# Patient Record
Sex: FEMALE | Race: WHITE | NOT HISPANIC OR LATINO | Employment: OTHER | URBAN - METROPOLITAN AREA
[De-identification: names, ages, dates, MRNs, and addresses within clinical notes are randomized per-mention and may not be internally consistent; named-entity substitution may affect disease eponyms.]

---

## 2019-10-07 ENCOUNTER — TELEPHONE (OUTPATIENT)
Dept: ENDOSCOPY | Facility: HOSPITAL | Age: 74
End: 2019-10-07

## 2019-10-07 DIAGNOSIS — R93.89 ABNORMAL FINDING ON IMAGING: Primary | ICD-10-CM

## 2019-10-07 NOTE — TELEPHONE ENCOUNTER
----- Message from Elkin Olivo MD sent at 10/4/2019  1:56 PM CDT -----  Regarding: RE: External referral  EUS for dilated duct and liver lesions  ----- Message -----  From: Alee Kohli MA  Sent: 10/4/2019   1:32 PM CDT  To: Elkin Olivo MD  Subject: FW: External referral                            Patient being referred for abn imaging showing dilated bile duct and liver lesion.   Please review  ----- Message -----  From: Maribel Barrios  Sent: 10/3/2019  12:45 PM CDT  To: Gualberto DELGADO Staff Dylon Moctezuma  Subject: External referral                                Patient being referred by Dr. Gold for possible EUS? Referral and records scanned into . Please advise.    Thanks!  Maribel

## 2019-10-08 ENCOUNTER — TELEPHONE (OUTPATIENT)
Dept: ENDOSCOPY | Facility: HOSPITAL | Age: 74
End: 2019-10-08

## 2019-10-08 PROBLEM — R93.89 ABNORMAL FINDINGS ON DIAGNOSTIC IMAGING OF OTHER SPECIFIED BODY STRUCTURES: Status: ACTIVE | Noted: 2019-10-08

## 2019-10-08 NOTE — TELEPHONE ENCOUNTER
----- Message from Anup Nunez sent at 10/8/2019  2:24 PM CDT -----  Contact: Pt's  French  Type:  Needs Medical Advice    Who Called: The caller states that he just missed your call and would like a call back please.    Best Call Back Number: [phone]

## 2019-10-15 ENCOUNTER — TELEPHONE (OUTPATIENT)
Dept: ENDOSCOPY | Facility: HOSPITAL | Age: 74
End: 2019-10-15

## 2019-10-15 NOTE — TELEPHONE ENCOUNTER
----- Message from Urban Avila sent at 10/15/2019 11:05 AM CDT -----  Contact: padmini ([facility] gastro): [phone]  Padmini called to speak with Shayla agee the pt     Please contact padmini ([facility] gastro): [phone]

## 2019-10-15 NOTE — TELEPHONE ENCOUNTER
----- Message from Urban Avila sent at 10/15/2019 11:05 AM CDT -----  Contact: padmini ([facility] gastro): [phone]  Padmini called to speak with Shayla agee the pt     Please contact padmini ([facility] gastro): [phone]

## 2019-10-22 ENCOUNTER — TELEPHONE (OUTPATIENT)
Dept: ENDOSCOPY | Facility: HOSPITAL | Age: 74
End: 2019-10-22

## 2019-10-22 NOTE — TELEPHONE ENCOUNTER
----- Message from Telly Foreman MD sent at 10/18/2019  2:18 PM CDT -----  Need [facility] clinic visit with the CD of her imaging. If no possible then EUS+/- ERCP and bring the CD's on the day of the procedure.  Telly Foreman MD  ----- Message -----  From: Alee Kohli MA  Sent: 10/18/2019   2:06 PM CDT  To: Telly Foreman MD    Patient was referred here, but had EUS done by Dr León. Dr Gold sent updated records because he still referring her here. Please review

## 2019-10-22 NOTE — TELEPHONE ENCOUNTER
----- Message from Urban Avila sent at 10/22/2019 10:22 AM CDT -----  Contact: pt: [phone]  Pt is returning a call to the clinic from Shayla    Please contact pt: [phone]

## 2019-10-23 ENCOUNTER — TELEPHONE (OUTPATIENT)
Dept: ENDOSCOPY | Facility: HOSPITAL | Age: 74
End: 2019-10-23

## 2019-10-23 NOTE — TELEPHONE ENCOUNTER
----- Message from Elly Schmidt sent at 10/23/2019  1:48 PM CDT -----  Contact: pt [phone]  Pt states that all three doctors are in network and she's going to keep her current appt

## 2019-10-31 ENCOUNTER — OFFICE VISIT (OUTPATIENT)
Dept: GASTROENTEROLOGY | Facility: CLINIC | Age: 74
End: 2019-10-31
Payer: MEDICARE

## 2019-10-31 VITALS
BODY MASS INDEX: 31.81 KG/M2 | DIASTOLIC BLOOD PRESSURE: 79 MMHG | HEART RATE: 66 BPM | SYSTOLIC BLOOD PRESSURE: 130 MMHG | WEIGHT: 209.19 LBS

## 2019-10-31 DIAGNOSIS — R93.89 ABNORMAL FINDINGS ON DIAGNOSTIC IMAGING OF OTHER SPECIFIED BODY STRUCTURES: ICD-10-CM

## 2019-10-31 DIAGNOSIS — K83.8 DILATED INTRAHEPATIC BILE DUCT: Primary | ICD-10-CM

## 2019-10-31 PROCEDURE — 99204 OFFICE O/P NEW MOD 45 MIN: CPT | Mod: S$PBB,,, | Performed by: INTERNAL MEDICINE

## 2019-10-31 PROCEDURE — 99213 OFFICE O/P EST LOW 20 MIN: CPT | Mod: PBBFAC,PO

## 2019-10-31 PROCEDURE — 99999 PR PBB SHADOW E&M-EST. PATIENT-LVL III: CPT | Mod: PBBFAC,,,

## 2019-10-31 PROCEDURE — 99999 PR PBB SHADOW E&M-EST. PATIENT-LVL III: ICD-10-PCS | Mod: PBBFAC,,,

## 2019-10-31 PROCEDURE — 99204 PR OFFICE/OUTPT VISIT, NEW, LEVL IV, 45-59 MIN: ICD-10-PCS | Mod: S$PBB,,, | Performed by: INTERNAL MEDICINE

## 2019-10-31 NOTE — PATIENT INSTRUCTIONS
Your ERCP is scheduled for 7:30am  Monday December 2,2019  With Dr Olivo at [facility], [address] [place] [address].  Check in at the admit desk on the first floor of the hospital.  Please contact the office at [phone] two days before procedure date to reschedule if you needed.    To ensure that your test is accurate and complete you must follow these instructions.  If you have any questions, please call our office at [phone].    Clear Liquids after 7:00 pm on the evening before your procedure  Brush your teeth with small amount of water is allowed.    You may take medication for blood pressure, seizure or heart with a small amount of water at 600 am the morning of the procedure.  Take none of your other medications until after the procedure is complete.    DO NOT TAKE BLOOD THINNERS OR MEDICATION THAT CONTAINS ASPIRIN.  YOU CAN TAKE 81 MG ASPIRIN    BLOOD THINNERS Coumadin, Aggrenox, Plavix, Pradaxz, Reapro, Pletal, Xarelto, Ticagrelor, Brillinta,  If you take these medications please notify the .    The hospital will call two days before the procedure with an arrival time.    YOU MUST HAVE SOMEONE WITH YOU TO DRIVE YOU HOME SINCE YOU WILL RECEIVE IV SEDATION    Thanks for choosing [facility]

## 2019-10-31 NOTE — PROGRESS NOTES
Subjective:       Patient ID: Patricia Charles is a 73 y.o. female.    Chief Complaint: Abnormal Abdominal/Liver Imaging    She was seeing her usually physician and had routine blood work that showed slight elevation of her liver tests.  This was followed up with an abdominal ultrasound, CT scan and subeqeunt MRI that all showed focal dilation of her left intrahepatic duct.  Dr León performed an EUS but was unable to see a mass lesion.    She's completely asymptomatic.  She has gallstones but no biliary cholic. She's never had jaundice.  As part of her  Workup she had viral serologies that were normal.    Her  id present today and augments the history    Review of Systems   All other systems reviewed and are negative.      Objective:      Physical Exam   Constitutional: She appears well-developed.   HENT:   Head: Normocephalic and atraumatic.   Eyes: Pupils are equal, round, and reactive to light.   Neck: Normal range of motion.   Cardiovascular: Normal rate and regular rhythm.   No murmur heard.  Pulmonary/Chest: Breath sounds normal. She has no wheezes. She has no rales.   Abdominal: Soft. Bowel sounds are normal. She exhibits no distension and no mass. There is no tenderness. No hernia.   Musculoskeletal: Normal range of motion.   Neurological: She is alert.   Skin: Skin is warm and dry.   Psychiatric: She has a normal mood and affect.       Assessment:       1. Dilated intrahepatic bile duct    2. Abnormal findings on diagnostic imaging of other specified body structures        Plan:       Asymptomatic dilation of the left system.  Thus far no imaging has been sufficient to assess insure there's no malignancy.  Would suggest ERCP with choledochoscopy.    We've made these arrangments

## 2019-11-27 ENCOUNTER — TELEPHONE (OUTPATIENT)
Dept: ENDOSCOPY | Facility: HOSPITAL | Age: 74
End: 2019-11-27

## 2019-11-27 NOTE — TELEPHONE ENCOUNTER
Spoke with patient about arrival time @0730    NPO status reviewed: Patient may eat until 7:00pm.  After 7pm, pt may have CLEAR liquids ONLY until completely NPO at Midnight.    Medications: Do not take Insulin or oral diabetic medications the day of the procedure.    Take as prescribed: heart, seizure and blood pressure medication in the morning with a sip of water (less than an ounce).  Take any breathing medications and bring inhalers to hospital with you.     Leave all valuables and jewelry at home. Wear comfortable clothes to procedure to change into hospital gown.   You cannot drive for 24 hours after your procedure because you will receive sedation for your procedure to make you comfortable.    A ride must be provided at discharge.

## 2019-12-01 ENCOUNTER — ANESTHESIA EVENT (OUTPATIENT)
Dept: ENDOSCOPY | Facility: HOSPITAL | Age: 74
End: 2019-12-01
Payer: MEDICARE

## 2019-12-02 ENCOUNTER — HOSPITAL ENCOUNTER (OUTPATIENT)
Facility: HOSPITAL | Age: 74
Discharge: HOME | End: 2019-12-02
Attending: INTERNAL MEDICINE | Admitting: INTERNAL MEDICINE
Payer: MEDICARE

## 2019-12-02 ENCOUNTER — ANESTHESIA (OUTPATIENT)
Dept: ENDOSCOPY | Facility: HOSPITAL | Age: 74
End: 2019-12-02
Payer: MEDICARE

## 2019-12-02 ENCOUNTER — TELEPHONE (OUTPATIENT)
Dept: ENDOSCOPY | Facility: HOSPITAL | Age: 74
End: 2019-12-02

## 2019-12-02 VITALS
TEMPERATURE: 97 F | BODY MASS INDEX: 29.86 KG/M2 | HEIGHT: 68 IN | DIASTOLIC BLOOD PRESSURE: 52 MMHG | RESPIRATION RATE: 19 BRPM | OXYGEN SATURATION: 96 % | HEART RATE: 67 BPM | SYSTOLIC BLOOD PRESSURE: 105 MMHG | WEIGHT: 197 LBS

## 2019-12-02 DIAGNOSIS — Z01.818 PREOP EXAMINATION: ICD-10-CM

## 2019-12-02 DIAGNOSIS — R93.89 ABNORMAL MRI: ICD-10-CM

## 2019-12-02 PROCEDURE — 27201674 HC SPHINCTERTOME: Performed by: INTERNAL MEDICINE

## 2019-12-02 PROCEDURE — 63600175 PHARM REV CODE 636 W HCPCS: Performed by: NURSE ANESTHETIST, CERTIFIED REGISTERED

## 2019-12-02 PROCEDURE — 43262 ENDO CHOLANGIOPANCREATOGRAPH: CPT | Performed by: INTERNAL MEDICINE

## 2019-12-02 PROCEDURE — 27202304 HC CANNULA, ERCP: Performed by: INTERNAL MEDICINE

## 2019-12-02 PROCEDURE — 43262 PR ERCP,SPHINCTEROTOMY: ICD-10-PCS | Mod: ,,, | Performed by: INTERNAL MEDICINE

## 2019-12-02 PROCEDURE — 63600175 PHARM REV CODE 636 W HCPCS: Performed by: INTERNAL MEDICINE

## 2019-12-02 PROCEDURE — 27201275 HC CATHETER, SPYSCOPE: Performed by: INTERNAL MEDICINE

## 2019-12-02 PROCEDURE — 74328 PR  X-RAY FOR BILE DUCT ENDOSCOPY: ICD-10-PCS | Mod: 26,,, | Performed by: INTERNAL MEDICINE

## 2019-12-02 PROCEDURE — 43262 ENDO CHOLANGIOPANCREATOGRAPH: CPT | Mod: ,,, | Performed by: INTERNAL MEDICINE

## 2019-12-02 PROCEDURE — 25000003 PHARM REV CODE 250: Performed by: NURSE ANESTHETIST, CERTIFIED REGISTERED

## 2019-12-02 PROCEDURE — 37000008 HC ANESTHESIA 1ST 15 MINUTES: Performed by: INTERNAL MEDICINE

## 2019-12-02 PROCEDURE — C1769 GUIDE WIRE: HCPCS | Performed by: INTERNAL MEDICINE

## 2019-12-02 PROCEDURE — 37000009 HC ANESTHESIA EA ADD 15 MINS: Performed by: INTERNAL MEDICINE

## 2019-12-02 PROCEDURE — 74328 X-RAY BILE DUCT ENDOSCOPY: CPT | Mod: 26,,, | Performed by: INTERNAL MEDICINE

## 2019-12-02 PROCEDURE — 43273 ENDOSCOPIC PANCREATOSCOPY: CPT | Performed by: INTERNAL MEDICINE

## 2019-12-02 RX ORDER — ONDANSETRON HYDROCHLORIDE 2 MG/ML
INJECTION, SOLUTION INTRAMUSCULAR; INTRAVENOUS
Status: DISCONTINUED | OUTPATIENT
Start: 2019-12-02 | End: 2019-12-02

## 2019-12-02 RX ORDER — SUCCINYLCHOLINE CHLORIDE 20 MG/ML
INJECTION INTRAMUSCULAR; INTRAVENOUS
Status: DISCONTINUED | OUTPATIENT
Start: 2019-12-02 | End: 2019-12-02

## 2019-12-02 RX ORDER — LIDOCAINE HCL/PF 100 MG/5ML
SYRINGE (ML) INTRAVENOUS
Status: DISCONTINUED | OUTPATIENT
Start: 2019-12-02 | End: 2019-12-02

## 2019-12-02 RX ORDER — ONDANSETRON 2 MG/ML
4 INJECTION INTRAMUSCULAR; INTRAVENOUS DAILY PRN
Status: DISCONTINUED | OUTPATIENT
Start: 2019-12-02 | End: 2019-12-02 | Stop reason: HOSPADM

## 2019-12-02 RX ORDER — SODIUM CHLORIDE 9 MG/ML
INJECTION, SOLUTION INTRAVENOUS CONTINUOUS
Status: DISCONTINUED | OUTPATIENT
Start: 2019-12-02 | End: 2019-12-02 | Stop reason: HOSPADM

## 2019-12-02 RX ORDER — FENTANYL CITRATE 50 UG/ML
INJECTION, SOLUTION INTRAMUSCULAR; INTRAVENOUS
Status: DISCONTINUED | OUTPATIENT
Start: 2019-12-02 | End: 2019-12-02

## 2019-12-02 RX ORDER — SODIUM CHLORIDE 0.9 % (FLUSH) 0.9 %
10 SYRINGE (ML) INJECTION
Status: DISCONTINUED | OUTPATIENT
Start: 2019-12-02 | End: 2019-12-02 | Stop reason: HOSPADM

## 2019-12-02 RX ORDER — CIPROFLOXACIN 2 MG/ML
INJECTION, SOLUTION INTRAVENOUS
Status: DISCONTINUED | OUTPATIENT
Start: 2019-12-02 | End: 2019-12-02

## 2019-12-02 RX ORDER — DEXAMETHASONE SODIUM PHOSPHATE 4 MG/ML
INJECTION, SOLUTION INTRA-ARTICULAR; INTRALESIONAL; INTRAMUSCULAR; INTRAVENOUS; SOFT TISSUE
Status: DISCONTINUED | OUTPATIENT
Start: 2019-12-02 | End: 2019-12-02

## 2019-12-02 RX ORDER — EPHEDRINE SULFATE 50 MG/ML
INJECTION, SOLUTION INTRAVENOUS
Status: DISCONTINUED | OUTPATIENT
Start: 2019-12-02 | End: 2019-12-02

## 2019-12-02 RX ORDER — PROPOFOL 10 MG/ML
VIAL (ML) INTRAVENOUS
Status: DISCONTINUED | OUTPATIENT
Start: 2019-12-02 | End: 2019-12-02

## 2019-12-02 RX ADMIN — SUCCINYLCHOLINE CHLORIDE 140 MG: 20 INJECTION, SOLUTION INTRAMUSCULAR; INTRAVENOUS at 08:12

## 2019-12-02 RX ADMIN — EPHEDRINE SULFATE 10 MG: 50 INJECTION, SOLUTION INTRAMUSCULAR; INTRAVENOUS; SUBCUTANEOUS at 08:12

## 2019-12-02 RX ADMIN — SODIUM CHLORIDE: 0.9 INJECTION, SOLUTION INTRAVENOUS at 07:12

## 2019-12-02 RX ADMIN — DEXAMETHASONE SODIUM PHOSPHATE 4 MG: 4 INJECTION, SOLUTION INTRAMUSCULAR; INTRAVENOUS at 08:12

## 2019-12-02 RX ADMIN — ONDANSETRON 8 MG: 2 INJECTION, SOLUTION INTRAMUSCULAR; INTRAVENOUS at 08:12

## 2019-12-02 RX ADMIN — PROPOFOL 200 MG: 10 INJECTION, EMULSION INTRAVENOUS at 08:12

## 2019-12-02 RX ADMIN — FENTANYL CITRATE 100 MCG: 50 INJECTION, SOLUTION INTRAMUSCULAR; INTRAVENOUS at 08:12

## 2019-12-02 RX ADMIN — CIPROFLOXACIN 400 MG: 2 INJECTION, SOLUTION INTRAVENOUS at 08:12

## 2019-12-02 RX ADMIN — LIDOCAINE HYDROCHLORIDE 80 MG: 20 INJECTION, SOLUTION INTRAVENOUS at 08:12

## 2019-12-02 NOTE — ANESTHESIA POSTPROCEDURE EVALUATION
Anesthesia Post Evaluation    Patient: Patricia Charles    Procedure(s) Performed: Procedure(s) (LRB):  ERCP (ENDOSCOPIC RETROGRADE CHOLANGIOPANCREATOGRAPHY) (N/A)    Final Anesthesia Type: general    Patient location during evaluation: PACU  Patient participation: Yes- Able to Participate  Level of consciousness: awake and alert  Post-procedure vital signs: reviewed and stable  Pain management: adequate  Airway patency: patent  OMAYRA mitigation strategies: Multimodal analgesia  PONV status at discharge: No PONV  Anesthetic complications: no      Cardiovascular status: hemodynamically stable and blood pressure returned to baseline  Respiratory status: room air, unassisted and spontaneous ventilation  Hydration status: euvolemic  Follow-up not needed.          Vitals Value Taken Time   /54 12/2/2019  9:47 AM   Temp 36.2 °C (97.2 °F) 12/2/2019  9:05 AM   Pulse 64 12/2/2019  9:48 AM   Resp 12 12/2/2019  9:48 AM   SpO2 92 % 12/2/2019  9:48 AM   Vitals shown include unvalidated device data.      No case tracking events are documented in the log.      Pain/Rahel Score: Rahel Score: 8 (12/2/2019  9:05 AM)

## 2019-12-02 NOTE — PLAN OF CARE
PIV discontinued with catheter intact. PIV insertion site clean, dry, and intact with no signs/symptoms of redness or swelling. Pressure dressing applied with gauze and coban. Instructed patient and family member to keep dressing on for at least 20-30 minutes. Both verbalized understanding.  Recovery complete. Patient recovered to baseline. Discharge instructions reviewed with patient. VSS.

## 2019-12-02 NOTE — H&P
Short Stay Endoscopy History and Physical    PCP - Liam Thomas MD  Referring Physician - [facility]  No address on file    Procedure - ercp  ASA - per anesthesia  Mallampati - per anesthesia  History of Anesthesia problems - no  Family history Anesthesia problems -  no   Plan of anesthesia - General    HPI:  This is a 73 y.o. female here for evaluation of: duct dilation    Reflux - no  Dysphagia - no  Abdominal pain - no  Diarrhea - no    ROS:  Constitutional: No fevers, chills, No weight loss  CV: No chest pain  Pulm: No cough, No shortness of breath  Ophtho: No vision changes  GI: see HPI  Derm: No rash    Medical History:  has a past medical history of Arthritis, Hypertension, Mass of left lobe of liver, and Thyroid disease.    Surgical History:  has a past surgical history that includes Tonsillectomy; Appendectomy;  section; Hysterectomy; Urethral sling; Breast biopsy; Eye surgery; Esophagogastroduodenoscopy (N/A, 10/8/2019); and Endoscopic ultrasound of upper gastrointestinal tract (Left, 10/8/2019).    Family History: family history includes Cancer in her sister..    Social History:  reports that she quit smoking about 32 years ago. She has never used smokeless tobacco. She reports that she does not drink alcohol.    Review of patient's allergies indicates:   Allergen Reactions    Pcn [penicillins]     Sulfa (sulfonamide antibiotics)        Medications:   Medications Prior to Admission   Medication Sig Dispense Refill Last Dose    aspirin (ECOTRIN) 81 MG EC tablet Take 81 mg by mouth once daily.   2019 at Unknown time    DULoxetine (CYMBALTA) 60 MG capsule Take 60 mg by mouth once daily.   2019 at Unknown time    ezetimibe (ZETIA) 10 mg tablet Take 10 mg by mouth once daily.   2019 at Unknown time    folic acid/multivit-min/lutein (CENTRUM SILVER ORAL) Take 1 tablet by mouth once daily.   2019 at Unknown time    hydroCHLOROthiazide (HYDRODIURIL) 25 MG tablet Take 25  mg by mouth once daily.   12/2/2019 at 0500    levothyroxine (SYNTHROID) 125 MCG tablet Take 125 mcg by mouth before breakfast.   12/1/2019 at Unknown time    losartan (COZAAR) 50 MG tablet Take 50 mg by mouth once daily.   12/2/2019 at 0500    potassium chloride (KLOR-CON 10) 10 MEQ TbSR Take 10 mEq by mouth once daily.   12/1/2019 at Unknown time       Physical Exam:    Vital Signs:   Vitals:    12/02/19 0734   BP: (!) 155/70   Pulse: 62   Resp: 18   Temp: 98.2 °F (36.8 °C)       General Appearance: Well appearing in no acute distress    Labs:  Lab Results   Component Value Date    WBC 6.15 10/08/2019    HGB 14.8 10/08/2019    HCT 44.7 10/08/2019     10/08/2019    ALT 58 (H) 10/08/2019    ALT 58 (H) 10/08/2019    AST 67 (H) 10/08/2019    AST 67 (H) 10/08/2019     10/08/2019    K 3.6 10/08/2019     10/08/2019    CREATININE 0.54 10/08/2019    BUN 13 10/08/2019    CO2 30 10/08/2019       I have explained the risks and benefits of this endoscopic procedure to the patient including but not limited to bleeding, inflammation, infection, perforation, and death.      Elkin Olivo MD

## 2019-12-02 NOTE — DISCHARGE INSTRUCTIONS
Discharge Instructions for ERCP (Endoscopic Retrograde Cholangiopancreatography)  You had a procedure known as an ERCP. Your healthcare provider performed the ERCP to look at your bile or pancreatic ducts, and to locate and treat blockages in the ducts. This procedure is used to diagnose diseases of the pancreas, bile ducts, and pancreatic duct, liver, and gallbladder. Heres what you need to do following your ERCP.  Home care  · Dont take aspirin or any other blood-thinning medicines (anticoagulants) until your provider says its OK.  · Your provider may prescribe an antibiotic, depending on what was done during the ERCP.  · You may have a sore throat for 1 to 2 days after the procedure. Use lozenges or gargle with salt water for your sore throat. If you're not better in a few days, call your provider.  · Rest, drink fluids, and eat light meals. If you feel bloated or have too much gas, use a heating pad on your belly to help reduce the discomfort. This should help you feel better. But if it doesn't, call your provider.  · Dont drink alcohol for 2 days after the procedure.  Follow-up care  Make a follow-up appointment as directed by our staff.     When to seek medical care  Call your provider right away if you have any of the following:  · Trouble swallowing or throat pain that gets worse   · Chest pain or severe belly or abdominal pain  · Fever above 100°F (37.7°C) or chills  · Upset stomach (nausea) and vomiting  · Black or tarry stools   Date Last Reviewed: 6/13/2015  © 3641-3736 [employer]. [address] [place] [address], [place] [address]. All rights reserved. This information is not intended as a substitute for professional medical care. Always follow your healthcare professional's instructions.

## 2019-12-02 NOTE — [employer] PATIENT INSTRUCTIONS
Discharge Summary/Instructions after an Endoscopic Procedure  Patient Name: Patricia Charles  Patient MRN: [id number]  Patient YOB: 1945 Monday, December 02, 2019  Elkin Olivo MD  RESTRICTIONS:  During your procedure today, you received medications for sedation.  These   medications may affect your judgment, balance and coordination.  Therefore,   for 24 hours, you have the following restrictions:   - DO NOT drive a car, operate machinery, make legal/financial decisions,   sign important papers or drink alcohol.    ACTIVITY:  Today: no heavy lifting, straining or running due to procedural   sedation/anesthesia.  The following day: return to full activity including work.  DIET:  Eat and drink normally unless instructed otherwise.     TREATMENT FOR COMMON SIDE EFFECTS:  - Mild abdominal pain, nausea, belching, bloating or excessive gas:  rest,   eat lightly and use a heating pad.  - Sore Throat: treat with throat lozenges and/or gargle with warm salt   water.  - Because air was used during the procedure, expelling large amounts of air   from your rectum or belching is normal.  - If a bowel prep was taken, you may not have a bowel movement for 1-3 days.    This is normal.  SYMPTOMS TO WATCH FOR AND REPORT TO YOUR PHYSICIAN:  1. Abdominal pain or bloating, other than gas cramps.  2. Chest pain.  3. Back pain.  4. Signs of infection such as: chills or fever occurring within 24 hours   after the procedure.  5. Rectal bleeding, which would show as bright red, maroon, or black stools.   (A tablespoon of blood from the rectum is not serious, especially if   hemorrhoids are present.)  6. Vomiting.  7. Weakness or dizziness.  GO DIRECTLY TO THE NEAREST EMERGENCY ROOM IF YOU HAVE ANY OF THE FOLLOWING:      Difficulty breathing              Chills and/or fever over 101 F   Persistent vomiting and/or vomiting blood   Severe abdominal pain   Severe chest pain   Black, tarry stools   Bleeding- more than one  tablespoon   Any other symptom or condition that you feel may need urgent attention  Your doctor recommends these additional instructions:  If any biopsies were taken, your doctors clinic will contact you in 1 to 2   weeks with any results.  - Discharge patient to home.   - Resume previous diet.   - Refer to a surgeon at appointment to be scheduled.   - Continue present medications.  For questions, problems or results please call your physician - Elkin Olivo MD at Work:  [phone].  EMERGENCY PHONE NUMBER: [phone],  LAB RESULTS: [phone]  IF A COMPLICATION OR EMERGENCY SITUATION ARISES AND YOU ARE UNABLE TO REACH   YOUR PHYSICIAN - GO DIRECTLY TO THE EMERGENCY ROOM.  Elkin Olivo MD  12/2/2019 8:57:10 AM  This report has been verified and signed electronically.  [employer]

## 2019-12-02 NOTE — ANESTHESIA PREPROCEDURE EVALUATION
2019  Patricia Charles is a 73 y.o., female for ERCP under MAC/GA    Past Medical History:   Diagnosis Date    Arthritis     Hypertension     Mass of left lobe of liver     Thyroid disease      Past Surgical History:   Procedure Laterality Date    APPENDECTOMY      BREAST BIOPSY      multiple     SECTION      ENDOSCOPIC ULTRASOUND OF UPPER GASTROINTESTINAL TRACT Left 10/8/2019    Procedure: ULTRASOUND, UPPER GI TRACT, ENDOSCOPIC;  Surgeon: Conrad León MD;  Location: [facility];  Service: Endoscopy;  Laterality: Left;  Eval & Bx with Linear scope    ESOPHAGOGASTRODUODENOSCOPY N/A 10/8/2019    Procedure: EGD (ESOPHAGOGASTRODUODENOSCOPY);  Surgeon: Conrad León MD;  Location: [facility];  Service: Endoscopy;  Laterality: N/A;    EYE SURGERY      cataract    HYSTERECTOMY      TONSILLECTOMY      URETHRAL SLING           Pre-op Assessment    I have reviewed the Patient Summary Reports.      I have reviewed the Medications.     Review of Systems  Anesthesia Hx:  No problems with previous Anesthesia    Social:  Former Smoker    Cardiovascular:   Hypertension    Musculoskeletal:   Arthritis         Physical Exam  General:  Well nourished    Airway/Jaw/Neck:  Airway Findings: Mouth Opening: Normal Tongue: Normal  General Airway Assessment: Adult  Mallampati: III  Improves to II with phonation.  TM Distance: Normal, at least 6 cm  Jaw/Neck Findings:  Neck ROM: Normal ROM      Dental:  Dental Findings: In tact   Chest/Lungs:  Chest/Lungs Findings: Normal Respiratory Rate     Heart/Vascular:  Heart Findings: Rate: Normal  Rhythm: Regular Rhythm        Mental Status:  Mental Status Findings:  Cooperative, Alert and Oriented       Lab Results   Component Value Date    WBC 6.15 10/08/2019    HGB 14.8 10/08/2019    HCT 44.7 10/08/2019     10/08/2019    ALT 58 (H) 10/08/2019     ALT 58 (H) 10/08/2019    AST 67 (H) 10/08/2019    AST 67 (H) 10/08/2019     10/08/2019    K 3.6 10/08/2019     10/08/2019    CREATININE 0.54 10/08/2019    BUN 13 10/08/2019    CO2 30 10/08/2019         Anesthesia Plan  Type of Anesthesia, risks & benefits discussed:  Anesthesia Type:  MAC  Patient's Preference:   Intra-op Monitoring Plan: standard ASA monitors  Intra-op Monitoring Plan Comments:   Post Op Pain Control Plan:   Post Op Pain Control Plan Comments:   Induction:   IV  Beta Blocker:  Patient is not currently on a Beta-Blocker (No further documentation required).       Informed Consent: Patient understands risks and agrees with Anesthesia plan.  Questions answered. Anesthesia consent signed with patient.  ASA Score: 2     Day of Surgery Review of History & Physical:            Ready For Surgery From Anesthesia Perspective.

## 2019-12-02 NOTE — TELEPHONE ENCOUNTER
----- Message from Elkin Olivo MD sent at 12/2/2019  8:58 AM CST -----  She needs to see Surg onc for a liver mass

## 2019-12-02 NOTE — TRANSFER OF CARE
"Anesthesia Transfer of Care Note    Patient: Patricia Charles    Procedure(s) Performed: Procedure(s) (LRB):  ERCP (ENDOSCOPIC RETROGRADE CHOLANGIOPANCREATOGRAPHY) (N/A)    Patient location: PACU    Anesthesia Type: general    Transport from OR: Transported from OR on 100% O2 by closed face mask with adequate spontaneous ventilation    Post pain: adequate analgesia    Post assessment: no apparent anesthetic complications    Post vital signs: stable    Level of consciousness: awake and alert    Nausea/Vomiting: no nausea/vomiting    Complications: none    Transfer of care protocol was followed      Last vitals:   Visit Vitals  BP (!) 122/59   Pulse 81   Temp 36.2 °C (97.2 °F) (Skin)   Resp 15   Ht 5' 8" (1.727 m)   Wt 89.4 kg (197 lb)   SpO2 97%   Breastfeeding? No   BMI 29.95 kg/m²     "

## 2019-12-04 ENCOUNTER — TELEPHONE (OUTPATIENT)
Dept: SURGERY | Facility: CLINIC | Age: 74
End: 2019-12-04

## 2019-12-04 NOTE — TELEPHONE ENCOUNTER
Patient returned call.  Referred by Dr. Olivo for consult.  Patient states at this time she is not ready to make an appointment.  Provided our contact number if she changes her mind.

## 2019-12-04 NOTE — TELEPHONE ENCOUNTER
Unable to contact patient at either phone numbers.  Messages left.  Also unable to contact patient at spouse or daughter's number.  (Need to schedule apt for liver mass and needs scans on disc from [place])

## 2019-12-10 ENCOUNTER — TELEPHONE (OUTPATIENT)
Dept: GASTROENTEROLOGY | Facility: CLINIC | Age: 74
End: 2019-12-10

## 2019-12-10 NOTE — TELEPHONE ENCOUNTER
Post procedure Instructions: Refer to a surgeon at appointment to be scheduled. Please contact patient to schedule.

## 2023-04-06 PROBLEM — C22.1 CHOLANGIOCARCINOMA: Status: ACTIVE | Noted: 2020-02-25
